# Patient Record
Sex: MALE | Race: WHITE | Employment: OTHER | URBAN - METROPOLITAN AREA
[De-identification: names, ages, dates, MRNs, and addresses within clinical notes are randomized per-mention and may not be internally consistent; named-entity substitution may affect disease eponyms.]

---

## 2021-04-06 ENCOUNTER — HOSPITAL ENCOUNTER (EMERGENCY)
Facility: CLINIC | Age: 81
Discharge: HOME | End: 2021-04-06
Attending: EMERGENCY MEDICINE | Admitting: EMERGENCY MEDICINE
Payer: MEDICARE

## 2021-04-06 VITALS
HEART RATE: 92 BPM | TEMPERATURE: 98.7 F | SYSTOLIC BLOOD PRESSURE: 124 MMHG | DIASTOLIC BLOOD PRESSURE: 78 MMHG | RESPIRATION RATE: 19 BRPM | OXYGEN SATURATION: 96 %

## 2021-04-06 DIAGNOSIS — M25.511 ACUTE PAIN OF RIGHT SHOULDER: ICD-10-CM

## 2021-04-06 LAB
INTERPRETATION ECG - MUSE: NORMAL
TROPONIN I SERPL-MCNC: <0.015 UG/L (ref 0–0.04)

## 2021-04-06 PROCEDURE — 250N000013 HC RX MED GY IP 250 OP 250 PS 637: Performed by: EMERGENCY MEDICINE

## 2021-04-06 PROCEDURE — 93005 ELECTROCARDIOGRAM TRACING: CPT

## 2021-04-06 PROCEDURE — 84484 ASSAY OF TROPONIN QUANT: CPT | Performed by: EMERGENCY MEDICINE

## 2021-04-06 PROCEDURE — 99284 EMERGENCY DEPT VISIT MOD MDM: CPT

## 2021-04-06 RX ORDER — TRAMADOL HYDROCHLORIDE 50 MG/1
50 TABLET ORAL EVERY 6 HOURS PRN
Qty: 20 TABLET | Refills: 0 | Status: SHIPPED | OUTPATIENT
Start: 2021-04-06

## 2021-04-06 RX ORDER — TRAMADOL HYDROCHLORIDE 50 MG/1
50 TABLET ORAL ONCE
Status: COMPLETED | OUTPATIENT
Start: 2021-04-06 | End: 2021-04-06

## 2021-04-06 RX ADMIN — TRAMADOL HYDROCHLORIDE 50 MG: 50 TABLET, FILM COATED ORAL at 04:10

## 2021-04-06 ASSESSMENT — ENCOUNTER SYMPTOMS
NUMBNESS: 0
ARTHRALGIAS: 1
SHORTNESS OF BREATH: 0
WEAKNESS: 0
LIGHT-HEADEDNESS: 0

## 2021-04-06 NOTE — ED TRIAGE NOTES
States he has always had issues with right shoulder, but now having right arm pain.  States he was diagnosed with afib 3/22.

## 2021-04-06 NOTE — ED PROVIDER NOTES
History     Chief Complaint:  Arm Pain      HPI  Desmond Lr is a 80 year old male with a history of arthritis, HTN, and A fib, anticoagulated with Eliquis who presents to the emergency department for evaluation of arm pain. Patient states he has had right shoulder pain for a while and had injections 3 weeks ago which slowly improved his pain. However, the pain worsened at 10pm tonight while getting up from his couch, prompting presentation. He states laying down exacerbates his pain. He denies recent fall. He took Tylenol x3 to no relief. No shortness of breath, lightheadedness, numbness, and weakness.     Review of Systems   Respiratory: Negative for shortness of breath.    Musculoskeletal: Positive for arthralgias.   Neurological: Negative for weakness, light-headedness and numbness.   All other systems reviewed and are negative.    Allergies:  Aspirin    Medications:    Finasteride  Allopurinol  Simvastatin  Eliquis  Metoprolol  Amlodipine    Past Medical History:    Gouty arthritis  A fib  OA  CKD stage 3  HTN  HLD  BPH    Past Surgical History:    Appendectomy  Right knee arthroscopy  Right hemicolectomy    Social History:  The patient presents to the emergency department with his son.    Physical Exam     Patient Vitals for the past 24 hrs:   BP Temp Temp src Pulse Resp SpO2   04/06/21 0430 124/78 -- -- 92 19 --   04/06/21 0330 (!) 143/92 -- -- 96 18 96 %   04/06/21 0244 136/82 98.7  F (37.1  C) Oral 90 16 96 %         Physical Exam  Eye:  Pupils are equal, round, and reactive.  Extraocular movements intact.    ENT:  No rhinorrhea.  Moist mucus membranes.  Normal tongue and tonsil.    Cardiac:  Irregularly irregular.  No murmurs, gallops, or rubs.    Pulmonary:  Clear to auscultation bilaterally.  No wheezes, rales, or rhonchi.    Abdomen:  Positive bowel sounds.  Abdomen is soft and non-distended, without focal tenderness.    Musculoskeletal:  Normal movement of all extremities without evidence for  deficit. There is diffuse tenderness to the right shoulder without swelling, redness, or warmth. Subacute bruising from prior trauma. Normal movement of the elbow, wrist, and fingers.    Skin:  Warm and dry without rashes.    Neurologic:  Non-focal exam without asymmetric weakness or numbness.     Psychiatric:  Normal affect with appropriate interaction with examiner.      Emergency Department Course   ECG  ECG taken at 245, ECG read at 253  Atrial Fibrillation with RVR with premature ventricular or aberrantly conducted complexes. Abnormal ECG.  Rate 101 bpm. OR interval * ms. QRS duration 78 ms. QT/QTc 332/430 ms. P-R-T axes * -3 72.     Laboratory:  Troponin (Collected 248): <0.015    Emergency Department Course:  Reviewed:  I reviewed the patient's nursing notes, vitals, past medical records, Care Everywhere.     Assessments:  444 I assessed the patient. Exam findings described above.    505 I reassessed and updated the patient.     Interventions:  410 Tramadol 50 mg Oral    Disposition:  Discharged to home.    Impression & Plan    Medical Decision Making:  This delightful 80-year-old gentleman with a history of arthritic shoulders presents to us with worsening of his right shoulder pain.  The patient notes that after receiving a injection to the shoulder approximately 3 weeks ago, it was feeling better.  However, after getting himself out of a deep armchair tonight, he pushed himself up and felt acute worsening of his shoulder pain.  It was keeping him from sleeping.  It feels similar to his prior shoulder pain but worse.  There was otherwise no sign of trauma and he denies weakness or numbness into the hand.  However, because of a recent diagnosis of atrial fibrillation, he also became concerned that this could represent an anginal equivalent or a stroke related issue and therefore presented to us for pain control and also evaluation of his heart.    On my exam, he appears clinically well.  All of his pain is  completely reproducible with palpation and movement of the shoulder.  In an abundance of caution, a troponin was sent which is negative.  He has had pain constantly for greater than 4 hours and I feel that this is sufficient to rule out acute coronary syndrome.  Otherwise, his EKG simply shows atrial fibrillation but he is appropriately rate controlled.  He feels improved after tramadol.  I will prescribe him further tramadol which she can use but he will continue to work through his orthopedist.  He is anticoagulated and I do not believe he requires any other intervention at this point.  He was advised to return to us if he has any worsening of condition or other emergent concerns.    Diagnosis:    ICD-10-CM    1. Acute pain of right shoulder  M25.511        Discharge Medications:  Discharge Medication List as of 4/6/2021  5:07 AM      START taking these medications    Details   traMADol (ULTRAM) 50 MG tablet Take 1 tablet (50 mg) by mouth every 6 hours as needed for severe pain, Disp-20 tablet, R-0, Local Print               Sanya Zimmerman  4/6/2021   EMERGENCY DEPARTMENT  Scribe Disclosure:  I, Sanya Zimmerman, am serving as a scribe at 4:44 AM on 4/6/2021 to document services personally performed by Trierweiler, Chad A, MD based on my observations and the provider's statements to me.          Trierweiler, Chad A, MD  04/06/21 5727